# Patient Record
Sex: FEMALE | URBAN - METROPOLITAN AREA
[De-identification: names, ages, dates, MRNs, and addresses within clinical notes are randomized per-mention and may not be internally consistent; named-entity substitution may affect disease eponyms.]

---

## 2024-10-29 ENCOUNTER — APPOINTMENT (RX ONLY)
Dept: URBAN - METROPOLITAN AREA CLINIC 93 | Facility: CLINIC | Age: 34
Setting detail: DERMATOLOGY
End: 2024-10-29

## 2024-10-29 DIAGNOSIS — L30.9 DERMATITIS, UNSPECIFIED: ICD-10-CM

## 2024-10-29 PROCEDURE — ? BIOPSY BY SHAVE METHOD

## 2024-10-29 PROCEDURE — 11102 TANGNTL BX SKIN SINGLE LES: CPT

## 2024-10-29 PROCEDURE — ? COUNSELING

## 2024-10-29 ASSESSMENT — LOCATION SIMPLE DESCRIPTION DERM
LOCATION SIMPLE: LEFT CHEEK
LOCATION SIMPLE: LEFT EAR
LOCATION SIMPLE: RIGHT EAR
LOCATION SIMPLE: POSTERIOR NECK

## 2024-10-29 ASSESSMENT — LOCATION ZONE DERM
LOCATION ZONE: EAR
LOCATION ZONE: FACE
LOCATION ZONE: NECK

## 2024-10-29 ASSESSMENT — LOCATION DETAILED DESCRIPTION DERM
LOCATION DETAILED: RIGHT POSTERIOR NECK
LOCATION DETAILED: RIGHT TRAGUS
LOCATION DETAILED: LEFT CYMBA CONCHA
LOCATION DETAILED: MID POSTERIOR NECK
LOCATION DETAILED: LEFT MID PREAURICULAR CHEEK

## 2024-10-29 NOTE — PROCEDURE: BIOPSY BY SHAVE METHOD
Detail Level: Detailed
Depth Of Biopsy: dermis
Was A Bandage Applied: Yes
Size Of Lesion In Cm: 0
Biopsy Type: H and E
Biopsy Method: Dermablade
Anesthesia Type: 1% lidocaine without epinephrine
Anesthesia Volume In Cc: 0.5
Hemostasis: Aluminum Chloride
Wound Care: Vaseline
Dressing: Band-Aid
Destruction After The Procedure: No
Type Of Destruction Used: Curettage
Curettage Text: The wound bed was treated with curettage after the biopsy was performed.
Cryotherapy Text: The wound bed was treated with cryotherapy after the biopsy was performed.
Electrodesiccation Text: The wound bed was treated with electrodesiccation after the biopsy was performed.
Electrodesiccation And Curettage Text: The wound bed was treated with electrodesiccation and curettage after the biopsy was performed.
Silver Nitrate Text: The wound bed was treated with silver nitrate after the biopsy was performed.
Lab: [id number]
Lab Facility: [id number]
Consent: Verbal consent was obtained and risks were reviewed including but not limited to scarring, infection, bleeding, scabbing, incomplete removal, nerve damage and allergy to anesthesia.
Post-Care Instructions: I reviewed with the patient in detail post-care instructions. Patient is to keep the biopsy site dry overnight, and then apply Aquaphor or Vaseline twice daily until healed.
Notification Instructions: Patient will be notified of biopsy results. However, patient instructed to call the office if not contacted within 2 weeks.
Billing Type: Third-Party Bill
Information: Selecting Yes will display possible errors in your note based on the variables you have selected. This validation is only offered as a suggestion for you. PLEASE NOTE THAT THE VALIDATION TEXT WILL BE REMOVED WHEN YOU FINALIZE YOUR NOTE. IF YOU WANT TO FAX A PRELIMINARY NOTE YOU WILL NEED TO TOGGLE THIS TO 'NO' IF YOU DO NOT WANT IT IN YOUR FAXED NOTE.

## 2024-10-29 NOTE — HPI: RASH
Mr. Healy is a 61 year old male with a past medical history of chronic back pain with concerns for L4/L5 osteomyelitis, alcohol abuse without withdrawal history who was brought in to [facility] after being found sleeping on the ground intoxicated, admitted for further management of possible osteomyelitis and hydronephrosis.     Hospital Course (by problem list):    #Chronic back pain.   Patient with chronic back pain and known destruction at L4-L5, with concerning imaging findings for osteomyelitis of the area. Patient underwent biopsy that did not yield bacterial data and refused discharge to [facility] for IV antibiotics while this was worked up. Patient with consistently low ESR/CRP/leukocytosis and no fevers.   Repeat MR lumbar also showing OM vs discitis, though no progression of disease. History of back injections for pain management.   Bx Culture: Gram positive cocci in clusters, likely contamination. Repeat culture sent.   Planned for dual isotope bone/gallium scan with SPECT CT with ID for differentiation.   -F/U sensitivities   - Likely follow up outpatient for dual isotope bone/gallium scan with SPECT CT and work up for back pain.   - Would hold antibiotics at this time given prolonged duration off of antibiotic therapy without progression  - Continue home gabapentin dose.    #Hydronephrosis, left.   Unchanged severe left hydronephrosis with marked cortical thinning, Followed by Urology for this issue and outpatient planned for 08/14 but planned to be rescheduled with possible in hospital transition.   -Left ureteroscopy unsuccessful per Urology. Plan left PCN placement (8/22)  - Urology following      #Fall.   Patient underwent a fall after drinking alcohol today, similar to presentation in April. At that time CT head was negative, patient denies head trauma or head pain at this time but is unable to remember the event. AOX3 at this time.   - PT consulted  - Would defer head CT at this time given no evidence of acute injury, neurologic deficit, or blood thinners.    #Hypothyroidism.   Diagnosed in April on Synthroid 150 mcg daily.   -Endo consulted  -TSH: 26.13 (8/21)    #Stage 3 chronic kidney disease.   Stable CKD stage 3 since admission at last hospitalization, was seen by DR. Aldana in the outpatient setting. Planned to undergo above procedure to improve function of left kidney.   - Renally dosed medications.   - Care with nephrotoxic medications.    #Alcohol abuse.   ·  Plan: Patient with history of intermittent alcohol abuse, frequent falls while using alcohol. Currently p/w 6 alcoholic beverages today, Denies history of withdrawal, DTs, or seizure.   - Continued to monitor symptomatically.    Patient was discharged to:     New medications:   Changes to old medications:  Medications that were stopped:    Items to follow up as outpatient:    Physical exam at the time of discharge:     
What Type Of Note Output Would You Prefer (Optional)?: Standard Output
How Severe Is Your Rash?: moderate
Is This A New Presentation, Or A Follow-Up?: Rash
Mr. Healy is a 61 year old male with a past medical history of chronic back pain with concerns for L4/L5 osteomyelitis, alcohol abuse without withdrawal history who was brought in to [facility] after being found sleeping on the ground intoxicated, admitted for further management of possible osteomyelitis and hydronephrosis.     Hospital Course (by problem list):    #Chronic back pain.   Patient with chronic back pain and known destruction at L4-L5, with concerning imaging findings for osteomyelitis of the area. Patient underwent biopsy that did not yield bacterial data and refused discharge to [facility] for IV antibiotics while this was worked up. Patient with consistently low ESR/CRP/leukocytosis and no fevers.   Repeat MR lumbar also showing OM vs discitis, though no progression of disease. History of back injections for pain management.   Bx Culture: Gram positive cocci in clusters, likely contamination. Repeat culture sent.   Planned for dual isotope bone/gallium scan with SPECT CT with ID for differentiation.   -F/U sensitivities   - Likely follow up outpatient for dual isotope bone/gallium scan with SPECT CT and work up for back pain.   - Would hold antibiotics at this time given prolonged duration off of antibiotic therapy without progression  - Continue home gabapentin dose.    #Hydronephrosis, left.   Unchanged severe left hydronephrosis with marked cortical thinning, Followed by Urology for this issue and outpatient planned for 08/14 but planned to be rescheduled with possible in hospital transition.   -Left ureteroscopy unsuccessful per Urology. Plan left PCN placement (8/22)  - Urology following      #Fall.   Patient underwent a fall after drinking alcohol today, similar to presentation in April. At that time CT head was negative, patient denies head trauma or head pain at this time but is unable to remember the event. AOX3 at this time.   - PT consulted  - Would defer head CT at this time given no evidence of acute injury, neurologic deficit, or blood thinners.    #Hypothyroidism.   Diagnosed in April on Synthroid 150 mcg daily.   -Endo rec-TSH was 26.13 and Free T4 <0.378 on 08/21  -04/2023, TSH was 8.38 and Free T4 was 0.744.    -Likely from non-compliance, continue levothyroxine 150mcg.  - recheck TFTs outpatient     #Stage 3 chronic kidney disease.   Stable CKD stage 3 since admission at last hospitalization, was seen by DR. Aldana in the outpatient setting. Planned to undergo above procedure to improve function of left kidney.   - Renally dosed medications.   - Care with nephrotoxic medications.    #Alcohol abuse.   ·  Plan: Patient with history of intermittent alcohol abuse, frequent falls while using alcohol. Currently p/w 6 alcoholic beverages today, Denies history of withdrawal, DTs, or seizure.   - Continued to monitor symptomatically.    Patient was discharged to:     New medications:   Changes to old medications:  Medications that were stopped:    Items to follow up as outpatient:    Physical exam at the time of discharge:     
Mr. Healy is a 61 year old male with a past medical history of chronic back pain with concerns for L4/L5 osteomyelitis, who presented to the hospital after being found down on the ground intoxicated, admitted for evaluation and management of possible osteomyelitis and hydronephrosis, now s/p left PCN placement on 8/22/23.     Hospital Course (by problem list):    #Chronic back pain.   Patient with chronic back pain and known destruction at L4-L5, with concerning imaging findings for osteomyelitis of the area. Patient underwent biopsy that did not yield bacterial data and refused discharge to [facility] for IV antibiotics while this was worked up. Patient with consistently low ESR/CRP/leukocytosis and no fevers. Repeat MR lumbar also showing OM vs discitis, though no progression of disease. History of back injections for pain management.    - Follow up outpatient for dual isotope bone/gallium scan with SPECT CT and work up for back pain.   - Continue home gabapentin dose.    #Hydronephrosis, left.   Unchanged severe left hydronephrosis with marked cortical thinning, followed by Urology for this issue and outpatient planned for 08/14 but planned to be rescheduled with possible in hospital transition. Left ureteroscopy unsuccessful per Urology. PCN placement on 8/22.  - F/u with Urology outpatient    #Fall.   Patient underwent a fall after drinking alcohol prior to admission, similar to presentation in April. At that time CT head was negative, patient denies head trauma or head pain at this time but is unable to remember the event. AOX3 at this time. Physical therapy recommending [facility].    #Hypothyroidism.   Diagnosed in April on Synthroid 150 mcg daily. Elevated TSH at 37.83 most likely to noncompliance with levothyroxine. Continued levothyroxine dose.  - Please continue with home medication    #Stage 3 chronic kidney disease.   Stable CKD stage 3 since admission at last hospitalization, was seen by Dr. Aldana in the outpatient setting. Left hydronephrosis seen, left uteroscopy unsuccessful by Urology so left PCN inserted by IR.  - Renally dosed medications.   - Care with nephrotoxic medications.    #Alcohol abuse.   Patient with history of intermittent alcohol abuse, frequent falls while using alcohol. Currently p/w 6 alcoholic beverages today, Denies history of withdrawal, DTs, or seizure. Provided thiamine and folic acid supplemenation  - Continued to monitor symptomatically.  - Continue with thiamine   - Continue with folic acid    Patient was discharged to: [facility]    New medications: None    Changes to old medications: None    Medications that were stopped: None    Items to follow up as outpatient: Outpatient Urology appt    Physical exam at the time of discharge:   Constitutional: NAD, comfortable in bed.  HEENT: NC/AT, PERRLA, EOMI, no conjunctival pallor or scleral icterus, MMM  Neck: Supple, no JVD  Respiratory: CTA B/L. No w/r/r.   Cardiovascular: RRR, normal S1 and S2, no m/r/g.   Gastrointestinal: +BS, soft NTND, no guarding or rebound tenderness, no palpable masses   Extremities: wwp; no cyanosis, clubbing or edema.  Back: Left nephrostomy tube draining normal colored urine. No pus, erythema from site.    Vascular: Pulses equal and strong throughout.   Neurological: AAOx3, no CN deficits, strength and sensation intact throughout.   Skin: No gross skin abnormalities or rashes

## 2024-11-05 ENCOUNTER — APPOINTMENT (RX ONLY)
Dept: URBAN - METROPOLITAN AREA CLINIC 93 | Facility: CLINIC | Age: 34
Setting detail: DERMATOLOGY
End: 2024-11-05

## 2024-11-05 DIAGNOSIS — L20.89 OTHER ATOPIC DERMATITIS: ICD-10-CM | Status: INADEQUATELY CONTROLLED

## 2024-11-05 PROCEDURE — ? ADDITIONAL NOTES

## 2024-11-05 PROCEDURE — ? PRESCRIPTION

## 2024-11-05 PROCEDURE — ? DUPIXENT INITIATION

## 2024-11-05 PROCEDURE — ? COUNSELING

## 2024-11-05 RX ORDER — DUPILUMAB 300 MG/2ML
300MG/2 ML INJECTION, SOLUTION SUBCUTANEOUS Q2WEEKS
Qty: 4 | Refills: 11 | Status: ERX | COMMUNITY
Start: 2024-11-05

## 2024-11-05 RX ADMIN — DUPILUMAB 300MG/2 ML: 300 INJECTION, SOLUTION SUBCUTANEOUS at 00:00

## 2024-11-05 ASSESSMENT — LOCATION SIMPLE DESCRIPTION DERM
LOCATION SIMPLE: RIGHT EAR
LOCATION SIMPLE: LEFT EAR
LOCATION SIMPLE: POSTERIOR NECK

## 2024-11-05 ASSESSMENT — LOCATION DETAILED DESCRIPTION DERM
LOCATION DETAILED: RIGHT INFERIOR POSTERIOR NECK
LOCATION DETAILED: LEFT CYMBA CONCHA
LOCATION DETAILED: RIGHT CAVUM CONCHA
LOCATION DETAILED: LEFT TRAGUS

## 2024-11-05 ASSESSMENT — LOCATION ZONE DERM
LOCATION ZONE: NECK
LOCATION ZONE: EAR

## 2024-11-05 NOTE — PROCEDURE: DUPIXENT INITIATION
Dupixent Monitoring Guidelines: There is no laboratory monitoring requirement with Dupixent.
Is Cyclosporine Contraindicated?: No
Detail Level: Zone
Dupixent Dosing: 600 mg SC day 0 then 300 mg SC every other week
Pregnancy And Lactation Warning Text: There have not been adverse fetal risks in women taking Dupixent while pregnant. It is unknown if this medication is excreted in breast milk.
Diagnosis (Required): Atopic Dermatitis/Eczematous Dermatitis

## 2024-11-05 NOTE — PROCEDURE: ADDITIONAL NOTES
Detail Level: Simple
Render Risk Assessment In Note?: no
Additional Notes: BSA: 11%\\nRating: Moderate\\n\\nLoading dose (600mg) administered in-office 11/5. Patient to continue with maintenance dose.

## 2024-11-12 ENCOUNTER — APPOINTMENT (RX ONLY)
Dept: URBAN - METROPOLITAN AREA CLINIC 93 | Facility: CLINIC | Age: 34
Setting detail: DERMATOLOGY
End: 2024-11-12

## 2024-11-12 DIAGNOSIS — L20.89 OTHER ATOPIC DERMATITIS: ICD-10-CM | Status: INADEQUATELY CONTROLLED

## 2024-11-12 DIAGNOSIS — L85.3 XEROSIS CUTIS: ICD-10-CM

## 2024-11-12 DIAGNOSIS — L21.8 OTHER SEBORRHEIC DERMATITIS: ICD-10-CM

## 2024-11-12 PROCEDURE — ? ADDITIONAL NOTES

## 2024-11-12 PROCEDURE — ? COUNSELING

## 2024-11-12 PROCEDURE — ? PRESCRIPTION

## 2024-11-12 PROCEDURE — ? OTC TREATMENT REGIMEN

## 2024-11-12 PROCEDURE — 99214 OFFICE O/P EST MOD 30 MIN: CPT

## 2024-11-12 PROCEDURE — ? PRESCRIPTION MEDICATION MANAGEMENT

## 2024-11-12 RX ORDER — HYDROCORTISONE 25 MG/G
OINTMENT TOPICAL BID
Qty: 28.35 | Refills: 4 | Status: ERX | COMMUNITY
Start: 2024-11-12

## 2024-11-12 RX ORDER — FLUOCINOLONE ACETONIDE 0.11 MG/ML
OIL AURICULAR (OTIC)
Qty: 20 | Refills: 5 | Status: ERX | COMMUNITY
Start: 2024-11-12

## 2024-11-12 RX ORDER — KETOCONAZOLE 20 MG/G
CREAM TOPICAL AS DIRECTED
Qty: 30 | Refills: 11 | Status: ERX | COMMUNITY
Start: 2024-11-12

## 2024-11-12 RX ADMIN — HYDROCORTISONE: 25 OINTMENT TOPICAL at 00:00

## 2024-11-12 RX ADMIN — KETOCONAZOLE: 20 CREAM TOPICAL at 00:00

## 2024-11-12 RX ADMIN — FLUOCINOLONE ACETONIDE: 0.11 OIL AURICULAR (OTIC) at 00:00

## 2024-11-12 ASSESSMENT — LOCATION ZONE DERM
LOCATION ZONE: NECK
LOCATION ZONE: TRUNK
LOCATION ZONE: NOSE
LOCATION ZONE: EAR

## 2024-11-12 ASSESSMENT — LOCATION SIMPLE DESCRIPTION DERM
LOCATION SIMPLE: UPPER BACK
LOCATION SIMPLE: POSTERIOR NECK
LOCATION SIMPLE: LEFT EAR
LOCATION SIMPLE: RIGHT EAR
LOCATION SIMPLE: RIGHT NOSE
LOCATION SIMPLE: LEFT NOSE

## 2024-11-12 ASSESSMENT — LOCATION DETAILED DESCRIPTION DERM
LOCATION DETAILED: LEFT TRAGUS
LOCATION DETAILED: RIGHT NASAL ALA
LOCATION DETAILED: LEFT CYMBA CONCHA
LOCATION DETAILED: RIGHT INFERIOR POSTERIOR NECK
LOCATION DETAILED: LEFT NASAL ALA
LOCATION DETAILED: INFERIOR THORACIC SPINE
LOCATION DETAILED: RIGHT CAVUM CONCHA

## 2024-11-12 ASSESSMENT — SEVERITY ASSESSMENT 2020: SEVERITY 2020: MODERATE

## 2024-11-12 ASSESSMENT — BSA ECZEMA: % BODY COVERED IN ECZEMA: 11

## 2024-11-12 NOTE — PROCEDURE: PRESCRIPTION MEDICATION MANAGEMENT
Initiate Treatment: hydrocortisone 2.5 % topical ointment: Apply to affected areas on neck and outsides of ears twice daily as needed for eczema flares\\n\\nfluocinolone acetonide oil 0.01 % ear drops: Apply to ear canal twice daily x2-3 weeks. Take 1 week break before continuing.
Continue Regimen: Dupixent 300 mg every 14 days
Detail Level: Zone
Render In Strict Bullet Format?: No
Initiate Treatment: ketoconazole 2 % topical cream: apply to rash affected areas of the nose once to twice daily when flared and a few times a week when dormant for maintenance.

## 2024-11-12 NOTE — PROCEDURE: OTC TREATMENT REGIMEN
Detail Level: Zone
Patient Specific Otc Recommendations (Will Not Stick From Patient To Patient): Vanicream applied after showering. Keep showers short and not too hot. Also recommended humidifier.

## 2024-11-12 NOTE — PROCEDURE: ADDITIONAL NOTES
Render Risk Assessment In Note?: no
Detail Level: Simple
Additional Notes: Discussed effects of Dupixent and expectations. Informed that itch sometimes improves after 1 injection, but can take 3-4 injections. Informed pt that better control takes sometimes 16 weeks.

## 2024-12-03 ENCOUNTER — APPOINTMENT (RX ONLY)
Dept: URBAN - METROPOLITAN AREA CLINIC 93 | Facility: CLINIC | Age: 34
Setting detail: DERMATOLOGY
End: 2024-12-03

## 2024-12-03 DIAGNOSIS — L20.89 OTHER ATOPIC DERMATITIS: ICD-10-CM | Status: INADEQUATELY CONTROLLED

## 2024-12-03 PROCEDURE — ? COUNSELING

## 2024-12-03 PROCEDURE — ? PRESCRIPTION MEDICATION MANAGEMENT

## 2024-12-03 PROCEDURE — ? PRESCRIPTION

## 2024-12-03 RX ORDER — TACROLIMUS 1 MG/G
OINTMENT TOPICAL AS DIRECTED
Qty: 30 | Refills: 2 | Status: ERX | COMMUNITY
Start: 2024-12-03

## 2024-12-03 RX ADMIN — TACROLIMUS: 1 OINTMENT TOPICAL at 00:00

## 2024-12-03 ASSESSMENT — LOCATION DETAILED DESCRIPTION DERM
LOCATION DETAILED: RIGHT CAVUM CONCHA
LOCATION DETAILED: LEFT TRAGUS
LOCATION DETAILED: RIGHT INFERIOR POSTERIOR NECK
LOCATION DETAILED: LEFT CYMBA CONCHA

## 2024-12-03 ASSESSMENT — LOCATION SIMPLE DESCRIPTION DERM
LOCATION SIMPLE: LEFT EAR
LOCATION SIMPLE: POSTERIOR NECK
LOCATION SIMPLE: RIGHT EAR

## 2024-12-03 ASSESSMENT — LOCATION ZONE DERM
LOCATION ZONE: NECK
LOCATION ZONE: EAR

## 2024-12-03 NOTE — PROCEDURE: PRESCRIPTION MEDICATION MANAGEMENT
Initiate Treatment: tacrolimus 0.1 % topical ointment: Apply to affected areas of rash twice daily until clear, then as needed for maintenance.
Continue Regimen: Dupixent 300 mg every 14 days
Discontinue Regimen: hydrocortisone 2.5 % topical ointment and fluocinolone acetonide oil 0.01 % ear drops
Detail Level: Zone
Plan: Rash inadequately controlled with topical steroids, will try TCI x6 weeks and then reevaluate. Continue to receive Dupixent samples q2 weeks until approved.
Render In Strict Bullet Format?: No

## 2025-01-16 RX ORDER — DUPILUMAB 300 MG/2ML
300MG/2 ML INJECTION, SOLUTION SUBCUTANEOUS Q2WEEKS
Qty: 4 | Refills: 11 | Status: ERX

## 2025-01-27 ENCOUNTER — APPOINTMENT (OUTPATIENT)
Dept: URBAN - METROPOLITAN AREA CLINIC 93 | Facility: CLINIC | Age: 35
Setting detail: DERMATOLOGY
End: 2025-01-27

## 2025-01-27 DIAGNOSIS — L20.89 OTHER ATOPIC DERMATITIS: ICD-10-CM

## 2025-01-27 PROCEDURE — ? DUPIXENT MONITORING

## 2025-01-27 PROCEDURE — ? COUNSELING

## 2025-01-27 PROCEDURE — 99213 OFFICE O/P EST LOW 20 MIN: CPT

## 2025-01-27 PROCEDURE — ? PRESCRIPTION MEDICATION MANAGEMENT

## 2025-01-27 PROCEDURE — ? PRESCRIPTION

## 2025-01-27 RX ORDER — CLOBETASOL PROPIONATE 0.5 MG/G
OINTMENT TOPICAL BID PRN
Qty: 30 | Refills: 6 | Status: ERX | COMMUNITY
Start: 2025-01-27

## 2025-01-27 RX ORDER — FLUCONAZOLE 200 MG/1
200MG TABLET ORAL QD
Qty: 2 | Refills: 2 | Status: ERX | COMMUNITY
Start: 2025-01-27

## 2025-01-27 RX ADMIN — CLOBETASOL PROPIONATE: 0.5 OINTMENT TOPICAL at 00:00

## 2025-01-27 RX ADMIN — FLUCONAZOLE 200MG: 200 TABLET ORAL at 00:00

## 2025-01-27 ASSESSMENT — LOCATION DETAILED DESCRIPTION DERM
LOCATION DETAILED: LEFT CAVUM CONCHA
LOCATION DETAILED: RIGHT CYMBA CONCHA
LOCATION DETAILED: RIGHT CAVUM CONCHA
LOCATION DETAILED: LEFT ANTIHELIX

## 2025-01-27 ASSESSMENT — LOCATION ZONE DERM: LOCATION ZONE: EAR

## 2025-01-27 ASSESSMENT — LOCATION SIMPLE DESCRIPTION DERM
LOCATION SIMPLE: LEFT EAR
LOCATION SIMPLE: RIGHT EAR

## 2025-01-27 NOTE — PROCEDURE: PRESCRIPTION MEDICATION MANAGEMENT
Initiate Treatment: fluconazole 200 mg tablet: Take one tablet on day 1 and one tablet on day 8 (to treat possible seborrheic dermatitis component) \\n\\nclobetasol 0.05 % topical ointment: Apply a thin layer topically to rash-affected areas of ears using Q-tip twice daily for 2 weeks, take one week break, then restart as needed; patient reports no improvement with weaker topical steroids and persistently itchy causing her to scratch a lot, so will try stronger topical for short period
Render In Strict Bullet Format?: No
Continue Regimen: Dupixent 300 mg q2 weeks.
Plan: Pt has seen ENT for issue, reports that ENT recommended seeing dermatologist. No improvement of ear rash with OTC antihistamines, hydrocortisone, tacrolimus, Fluocinolone ear oil, Ketoconazole cream.\\n\\nDiscussed starting Rinvoq if not improved in 6-8 weeks.
Detail Level: Zone

## 2025-08-04 ENCOUNTER — APPOINTMENT (OUTPATIENT)
Dept: URBAN - METROPOLITAN AREA CLINIC 93 | Facility: CLINIC | Age: 35
Setting detail: DERMATOLOGY
End: 2025-08-04

## 2025-08-04 DIAGNOSIS — L21.8 OTHER SEBORRHEIC DERMATITIS: ICD-10-CM

## 2025-08-04 DIAGNOSIS — L20.89 OTHER ATOPIC DERMATITIS: ICD-10-CM | Status: WORSENING

## 2025-08-04 PROCEDURE — ? NEMLUVIO INITIATION

## 2025-08-04 PROCEDURE — ? PRESCRIPTION MEDICATION MANAGEMENT

## 2025-08-04 PROCEDURE — ? PRESCRIPTION

## 2025-08-04 PROCEDURE — ? COUNSELING

## 2025-08-04 RX ORDER — NEMOLIZUMAB-ILTO 30 MG/100MG
INJECTION, POWDER, LYOPHILIZED, FOR SOLUTION SUBCUTANEOUS
Qty: 1 | Refills: 11 | Status: ERX | COMMUNITY
Start: 2025-08-04

## 2025-08-04 RX ORDER — ROFLUMILAST 3 MG/G
APPLY AEROSOL, FOAM TOPICAL QD
Qty: 60 | Refills: 5 | Status: ERX | COMMUNITY
Start: 2025-08-04

## 2025-08-04 RX ADMIN — NEMOLIZUMAB-ILTO: 30 INJECTION, POWDER, LYOPHILIZED, FOR SOLUTION SUBCUTANEOUS at 00:00

## 2025-08-04 RX ADMIN — ROFLUMILAST APPLY: 3 AEROSOL, FOAM TOPICAL at 00:00

## 2025-08-04 ASSESSMENT — LOCATION SIMPLE DESCRIPTION DERM
LOCATION SIMPLE: SCALP
LOCATION SIMPLE: RIGHT EAR
LOCATION SIMPLE: LEFT EAR

## 2025-08-04 ASSESSMENT — LOCATION DETAILED DESCRIPTION DERM
LOCATION DETAILED: LEFT CAVUM CONCHA
LOCATION DETAILED: RIGHT CYMBA CONCHA
LOCATION DETAILED: LEFT ANTIHELIX
LOCATION DETAILED: RIGHT CAVUM CONCHA
LOCATION DETAILED: RIGHT SUPERIOR PARIETAL SCALP

## 2025-08-04 ASSESSMENT — BSA ECZEMA: % BODY COVERED IN ECZEMA: 10

## 2025-08-04 ASSESSMENT — SEVERITY ASSESSMENT 2020: SEVERITY 2020: MODERATE

## 2025-08-04 ASSESSMENT — ITCH NUMERIC RATING SCALE: HOW SEVERE IS YOUR ITCHING?: 7

## 2025-08-04 ASSESSMENT — LOCATION ZONE DERM
LOCATION ZONE: SCALP
LOCATION ZONE: EAR

## 2025-08-04 ASSESSMENT — SEVERITY ASSESSMENT: HOW SEVERE IS THIS PATIENT'S CONDITION?: MODERATE
